# Patient Record
Sex: FEMALE | Race: WHITE | NOT HISPANIC OR LATINO
[De-identification: names, ages, dates, MRNs, and addresses within clinical notes are randomized per-mention and may not be internally consistent; named-entity substitution may affect disease eponyms.]

---

## 2019-01-24 ENCOUNTER — ASOB RESULT (OUTPATIENT)
Age: 29
End: 2019-01-24

## 2019-01-24 ENCOUNTER — APPOINTMENT (OUTPATIENT)
Dept: ANTEPARTUM | Facility: CLINIC | Age: 29
End: 2019-01-24
Payer: MEDICAID

## 2019-01-24 PROCEDURE — 76811 OB US DETAILED SNGL FETUS: CPT

## 2019-01-24 PROCEDURE — 76817 TRANSVAGINAL US OBSTETRIC: CPT

## 2019-05-09 ENCOUNTER — OUTPATIENT (OUTPATIENT)
Dept: OUTPATIENT SERVICES | Facility: HOSPITAL | Age: 29
LOS: 1 days | Discharge: HOME | End: 2019-05-09
Payer: MEDICAID

## 2019-05-09 VITALS — HEART RATE: 82 BPM | SYSTOLIC BLOOD PRESSURE: 116 MMHG | DIASTOLIC BLOOD PRESSURE: 72 MMHG

## 2019-05-09 VITALS — TEMPERATURE: 98 F

## 2019-05-09 LAB
ALLERGY+IMMUNOLOGY DIAG STUDY NOTE: SIGNIFICANT CHANGE UP
TYPE + AB SCN PNL BLD: SIGNIFICANT CHANGE UP

## 2019-05-09 PROCEDURE — 99213 OFFICE O/P EST LOW 20 MIN: CPT | Mod: 25

## 2019-05-09 PROCEDURE — 59412 ANTEPARTUM MANIPULATION: CPT

## 2019-05-09 RX ADMIN — Medication 0.25 MILLIGRAM(S): at 13:15

## 2019-05-09 NOTE — OB PROVIDER TRIAGE NOTE - NSOBPROVIDERNOTE_OBGYN_ALL_OB_FT
30 y/o  @ 38 weeks [facility] for ECV  - efm   - terbutaline  - Dr Dunn aware 30 y/o  @ 38 weeks breech for ECV  - efm   - terbutaline  - Dr Dunn aware    patient with fetus in breech presentation, for ECV, r/b/a rev, ques answered.  Consent obtained.

## 2019-05-09 NOTE — OB PROVIDER TRIAGE NOTE - HISTORY OF PRESENT ILLNESS
30 y/o  @ 38 weeks latesha 19 dated by lmp who is breech who presents for ECV. Pt reports + fm no srom no vaginal bleeding no contractions.  Pt is previous c/s with 2 subsequent successful

## 2019-05-09 NOTE — OB PROVIDER TRIAGE NOTE - NSHPPHYSICALEXAM_GEN_ALL_CORE
PHYSICAL EXAM:  T(F): 98 (05-09 @ 12:30)  blood glucose finger stick 84    Constitutional: AAOx3, NAD  Abdomen: Soft, gravid, nontender, no palpable ctx   CAT I  TOCO Q none  bedside sono breech presentation, placenta posterior  back right side

## 2019-05-09 NOTE — PROGRESS NOTE ADULT - SUBJECTIVE AND OBJECTIVE BOX
External Cephalic Version:    Preprocedure diagnosis: Zachary breech  Postprocedure diagnosis: Zachary breech  Procedure: External Cephalic Version  Findings: Stable FHR pre and post procedure    Clinical Note:  Patient is a 28 yo  at 38w0d GA who presented to L&D for a scheduled external cephalic version. A reactive fetal heart tracing was obtained and transverse presentation was confirmed on sonogram (anterior placenta, adequate fluid). Risks of external cephalic version, including abnormal FHR, PROM, abruption, fetal injury, possible emergent  section and failed external cephalic version were discussed with the patient and informed consent was obtained.    Procedure Note:  Terbutaline was administered at 1316. The fetal pelvis was noted at maternal pelvis, spine along maternal right. The presentation portion of the fetus was elevated out of the maternal pelvis, and using gentle but firm pressure of the 's hands on fetal buttocks and posterior occiput to move fetus is a clockwise motion. Position was held for approximately 1 minute. Fetal heart rate was noted on sonogram to be slow. Pressure was released. Fetal heart monitor was replaced on maternal abdomen. FH was noted to be in the 60s. IV fluid bolus was started, O2 therapy was given, patient position was changed to left lateral. Fetal heart rate slowly increased back to baseline of 120bpm, moderate variability, positive accelerations. She will be monitored for a prolonged period of time to assess for contractions and fetal heart rate abnormalities. She will have a confirmatory sonogram prior to discharge if discharged.    Dr. Dunn at bedside

## 2019-05-09 NOTE — PROGRESS NOTE ADULT - ATTENDING COMMENTS
Patient presented for elective ECV at 38 weeks, h/o one , three vaginal births.    post r/b/a rev, ques answered, consent obtained, rh +, terb administered IV.  One forward roll attempted, fetal bradycarida noted.  O2 aministered, O.R. room set up.  FHR recovered.     FHR reactive, category one, bpp 8/8.    Options rev, will d/c home and f/u within one week.    precautions

## 2019-05-17 ENCOUNTER — INPATIENT (INPATIENT)
Facility: HOSPITAL | Age: 29
LOS: 1 days | Discharge: HOME | End: 2019-05-19
Attending: OBSTETRICS & GYNECOLOGY | Admitting: OBSTETRICS & GYNECOLOGY
Payer: MEDICAID

## 2019-05-17 VITALS
RESPIRATION RATE: 20 BRPM | TEMPERATURE: 98 F | HEART RATE: 93 BPM | SYSTOLIC BLOOD PRESSURE: 128 MMHG | DIASTOLIC BLOOD PRESSURE: 82 MMHG

## 2019-05-17 LAB
AMPHET UR-MCNC: NEGATIVE — SIGNIFICANT CHANGE UP
APPEARANCE UR: ABNORMAL
BACTERIA # UR AUTO: ABNORMAL /HPF
BARBITURATES UR SCN-MCNC: NEGATIVE — SIGNIFICANT CHANGE UP
BASOPHILS # BLD AUTO: 0.04 K/UL — SIGNIFICANT CHANGE UP (ref 0–0.2)
BASOPHILS NFR BLD AUTO: 0.4 % — SIGNIFICANT CHANGE UP (ref 0–1)
BENZODIAZ UR-MCNC: NEGATIVE — SIGNIFICANT CHANGE UP
BILIRUB UR-MCNC: NEGATIVE — SIGNIFICANT CHANGE UP
BLD GP AB SCN SERPL QL: SIGNIFICANT CHANGE UP
BUPRENORPHINE SCREEN, URINE RESULT: NEGATIVE — SIGNIFICANT CHANGE UP
COCAINE METAB.OTHER UR-MCNC: NEGATIVE — SIGNIFICANT CHANGE UP
COLOR SPEC: YELLOW — SIGNIFICANT CHANGE UP
DIFF PNL FLD: NEGATIVE — SIGNIFICANT CHANGE UP
EOSINOPHIL # BLD AUTO: 0.03 K/UL — SIGNIFICANT CHANGE UP (ref 0–0.7)
EOSINOPHIL NFR BLD AUTO: 0.3 % — SIGNIFICANT CHANGE UP (ref 0–8)
GLUCOSE UR QL: NEGATIVE MG/DL — SIGNIFICANT CHANGE UP
HCT VFR BLD CALC: 37.7 % — SIGNIFICANT CHANGE UP (ref 37–47)
HGB BLD-MCNC: 13.4 G/DL — SIGNIFICANT CHANGE UP (ref 12–16)
IMM GRANULOCYTES NFR BLD AUTO: 0.6 % — HIGH (ref 0.1–0.3)
KETONES UR-MCNC: 15
L&D DRUG SCREEN, URINE: SIGNIFICANT CHANGE UP
LEUKOCYTE ESTERASE UR-ACNC: ABNORMAL
LYMPHOCYTES # BLD AUTO: 2.53 K/UL — SIGNIFICANT CHANGE UP (ref 1.2–3.4)
LYMPHOCYTES # BLD AUTO: 23.9 % — SIGNIFICANT CHANGE UP (ref 20.5–51.1)
MCHC RBC-ENTMCNC: 33.3 PG — HIGH (ref 27–31)
MCHC RBC-ENTMCNC: 35.5 G/DL — SIGNIFICANT CHANGE UP (ref 32–37)
MCV RBC AUTO: 93.5 FL — SIGNIFICANT CHANGE UP (ref 81–99)
METHADONE UR-MCNC: NEGATIVE — SIGNIFICANT CHANGE UP
MONOCYTES # BLD AUTO: 0.85 K/UL — HIGH (ref 0.1–0.6)
MONOCYTES NFR BLD AUTO: 8 % — SIGNIFICANT CHANGE UP (ref 1.7–9.3)
NEUTROPHILS # BLD AUTO: 7.09 K/UL — HIGH (ref 1.4–6.5)
NEUTROPHILS NFR BLD AUTO: 66.8 % — SIGNIFICANT CHANGE UP (ref 42.2–75.2)
NITRITE UR-MCNC: NEGATIVE — SIGNIFICANT CHANGE UP
NRBC # BLD: 0 /100 WBCS — SIGNIFICANT CHANGE UP (ref 0–0)
OPIATES UR-MCNC: NEGATIVE — SIGNIFICANT CHANGE UP
OXYCODONE UR-MCNC: NEGATIVE — SIGNIFICANT CHANGE UP
PCP UR-MCNC: NEGATIVE — SIGNIFICANT CHANGE UP
PH UR: 6 — SIGNIFICANT CHANGE UP (ref 5–8)
PLATELET # BLD AUTO: 133 K/UL — SIGNIFICANT CHANGE UP (ref 130–400)
PRENATAL SYPHILIS TEST: SIGNIFICANT CHANGE UP
PROPOXYPHENE QUALITATIVE URINE RESULT: NEGATIVE — SIGNIFICANT CHANGE UP
PROT UR-MCNC: NEGATIVE MG/DL — SIGNIFICANT CHANGE UP
RBC # BLD: 4.03 M/UL — LOW (ref 4.2–5.4)
RBC # FLD: 13.2 % — SIGNIFICANT CHANGE UP (ref 11.5–14.5)
SP GR SPEC: 1.01 — SIGNIFICANT CHANGE UP (ref 1.01–1.03)
TYPE + AB SCN PNL BLD: SIGNIFICANT CHANGE UP
UROBILINOGEN FLD QL: 0.2 MG/DL — SIGNIFICANT CHANGE UP (ref 0.2–0.2)
WBC # BLD: 10.6 K/UL — SIGNIFICANT CHANGE UP (ref 4.8–10.8)
WBC # FLD AUTO: 10.6 K/UL — SIGNIFICANT CHANGE UP (ref 4.8–10.8)
WBC UR QL: SIGNIFICANT CHANGE UP /HPF

## 2019-05-17 PROCEDURE — 59412 ANTEPARTUM MANIPULATION: CPT | Mod: 59

## 2019-05-17 PROCEDURE — 59510 CESAREAN DELIVERY: CPT | Mod: U9

## 2019-05-17 RX ORDER — SODIUM CHLORIDE 9 MG/ML
1000 INJECTION, SOLUTION INTRAVENOUS
Refills: 0 | Status: DISCONTINUED | OUTPATIENT
Start: 2019-05-17 | End: 2019-05-17

## 2019-05-17 RX ORDER — MAGNESIUM HYDROXIDE 400 MG/1
30 TABLET, CHEWABLE ORAL
Refills: 0 | Status: DISCONTINUED | OUTPATIENT
Start: 2019-05-17 | End: 2019-05-19

## 2019-05-17 RX ORDER — CEFAZOLIN SODIUM 1 G
2000 VIAL (EA) INJECTION EVERY 8 HOURS
Refills: 0 | Status: COMPLETED | OUTPATIENT
Start: 2019-05-17 | End: 2019-05-18

## 2019-05-17 RX ORDER — ENOXAPARIN SODIUM 100 MG/ML
40 INJECTION SUBCUTANEOUS AT BEDTIME
Refills: 0 | Status: DISCONTINUED | OUTPATIENT
Start: 2019-05-17 | End: 2019-05-19

## 2019-05-17 RX ORDER — DOCUSATE SODIUM 100 MG
100 CAPSULE ORAL
Refills: 0 | Status: DISCONTINUED | OUTPATIENT
Start: 2019-05-17 | End: 2019-05-19

## 2019-05-17 RX ORDER — GLYCERIN ADULT
1 SUPPOSITORY, RECTAL RECTAL AT BEDTIME
Refills: 0 | Status: DISCONTINUED | OUTPATIENT
Start: 2019-05-17 | End: 2019-05-19

## 2019-05-17 RX ORDER — BUTORPHANOL TARTRATE 2 MG/ML
2 INJECTION, SOLUTION INTRAMUSCULAR; INTRAVENOUS ONCE
Refills: 0 | Status: DISCONTINUED | OUTPATIENT
Start: 2019-05-17 | End: 2019-05-19

## 2019-05-17 RX ORDER — OXYTOCIN 10 UNIT/ML
333.33 VIAL (ML) INJECTION
Qty: 20 | Refills: 0 | Status: DISCONTINUED | OUTPATIENT
Start: 2019-05-17 | End: 2019-05-19

## 2019-05-17 RX ORDER — KETOROLAC TROMETHAMINE 30 MG/ML
30 SYRINGE (ML) INJECTION EVERY 6 HOURS
Refills: 0 | Status: DISCONTINUED | OUTPATIENT
Start: 2019-05-17 | End: 2019-05-18

## 2019-05-17 RX ORDER — LANOLIN
1 OINTMENT (GRAM) TOPICAL EVERY 6 HOURS
Refills: 0 | Status: DISCONTINUED | OUTPATIENT
Start: 2019-05-17 | End: 2019-05-19

## 2019-05-17 RX ORDER — ONDANSETRON 8 MG/1
4 TABLET, FILM COATED ORAL EVERY 6 HOURS
Refills: 0 | Status: DISCONTINUED | OUTPATIENT
Start: 2019-05-17 | End: 2019-05-19

## 2019-05-17 RX ORDER — OXYCODONE HYDROCHLORIDE 5 MG/1
5 TABLET ORAL
Refills: 0 | Status: DISCONTINUED | OUTPATIENT
Start: 2019-05-17 | End: 2019-05-19

## 2019-05-17 RX ORDER — IBUPROFEN 200 MG
600 TABLET ORAL EVERY 6 HOURS
Refills: 0 | Status: COMPLETED | OUTPATIENT
Start: 2019-05-17 | End: 2020-04-14

## 2019-05-17 RX ORDER — ACETAMINOPHEN 500 MG
650 TABLET ORAL EVERY 6 HOURS
Refills: 0 | Status: DISCONTINUED | OUTPATIENT
Start: 2019-05-17 | End: 2019-05-19

## 2019-05-17 RX ORDER — OXYCODONE HYDROCHLORIDE 5 MG/1
5 TABLET ORAL ONCE
Refills: 0 | Status: DISCONTINUED | OUTPATIENT
Start: 2019-05-17 | End: 2019-05-19

## 2019-05-17 RX ORDER — SODIUM CHLORIDE 9 MG/ML
1000 INJECTION, SOLUTION INTRAVENOUS
Refills: 0 | Status: DISCONTINUED | OUTPATIENT
Start: 2019-05-17 | End: 2019-05-19

## 2019-05-17 RX ORDER — SIMETHICONE 80 MG/1
80 TABLET, CHEWABLE ORAL EVERY 4 HOURS
Refills: 0 | Status: DISCONTINUED | OUTPATIENT
Start: 2019-05-17 | End: 2019-05-19

## 2019-05-17 RX ORDER — MORPHINE SULFATE 50 MG/1
2 CAPSULE, EXTENDED RELEASE ORAL ONCE
Refills: 0 | Status: DISCONTINUED | OUTPATIENT
Start: 2019-05-17 | End: 2019-05-19

## 2019-05-17 RX ORDER — DEXAMETHASONE 0.5 MG/5ML
4 ELIXIR ORAL EVERY 6 HOURS
Refills: 0 | Status: DISCONTINUED | OUTPATIENT
Start: 2019-05-17 | End: 2019-05-19

## 2019-05-17 RX ORDER — NALOXONE HYDROCHLORIDE 4 MG/.1ML
0.1 SPRAY NASAL
Refills: 0 | Status: DISCONTINUED | OUTPATIENT
Start: 2019-05-17 | End: 2019-05-19

## 2019-05-17 RX ORDER — CEFAZOLIN SODIUM 1 G
2000 VIAL (EA) INJECTION ONCE
Refills: 0 | Status: COMPLETED | OUTPATIENT
Start: 2019-05-17 | End: 2019-05-17

## 2019-05-17 RX ORDER — DIPHENHYDRAMINE HCL 50 MG
25 CAPSULE ORAL EVERY 6 HOURS
Refills: 0 | Status: DISCONTINUED | OUTPATIENT
Start: 2019-05-17 | End: 2019-05-19

## 2019-05-17 RX ORDER — ACETAMINOPHEN 500 MG
1000 TABLET ORAL ONCE
Refills: 0 | Status: DISCONTINUED | OUTPATIENT
Start: 2019-05-17 | End: 2019-05-19

## 2019-05-17 RX ADMIN — SIMETHICONE 80 MILLIGRAM(S): 80 TABLET, CHEWABLE ORAL at 17:58

## 2019-05-17 RX ADMIN — Medication 100 MILLIGRAM(S): at 17:58

## 2019-05-17 RX ADMIN — ENOXAPARIN SODIUM 40 MILLIGRAM(S): 100 INJECTION SUBCUTANEOUS at 21:21

## 2019-05-17 RX ADMIN — SIMETHICONE 80 MILLIGRAM(S): 80 TABLET, CHEWABLE ORAL at 21:22

## 2019-05-17 RX ADMIN — Medication 100 MILLIGRAM(S): at 21:21

## 2019-05-17 RX ADMIN — Medication 30 MILLIGRAM(S): at 17:57

## 2019-05-17 RX ADMIN — SIMETHICONE 80 MILLIGRAM(S): 80 TABLET, CHEWABLE ORAL at 14:57

## 2019-05-17 RX ADMIN — Medication 100 MILLIGRAM(S): at 14:57

## 2019-05-17 RX ADMIN — Medication 100 MILLIGRAM(S): at 05:53

## 2019-05-17 NOTE — BRIEF OPERATIVE NOTE - OPERATION/FINDINGS
Live infant delivered in chloé breech presentation, APGARs 9/9, Bwt 3670g. Normal appearing uterus, fallopian tubes and ovaries. Live male infant delivered in chloé breech presentation, APGARs 9/9, Bwt 3670g. Normal appearing uterus, fallopian tubes and ovaries.

## 2019-05-17 NOTE — PRE-ANESTHESIA EVALUATION ADULT - NSANTHOSAYNRD_GEN_A_CORE
No. SAGRARIO screening performed.  STOP BANG Legend: 0-2 = LOW Risk; 3-4 = INTERMEDIATE Risk; 5-8 = HIGH Risk

## 2019-05-17 NOTE — OB PROVIDER H&P - HISTORY OF PRESENT ILLNESS
28yo , at 39w1d, dated by LMP, GBS negative, in labor with complete breech presentation. She reports contractions started yesterday morning, and got worse throughout the day. She reports her pain right now about 5/10 and is doing well. Denies complications during the pregnancy other than breech presentation. She had an attempted ECV on 19, which was unsuccessful. Denies LOF and VB, reports good FM.

## 2019-05-17 NOTE — BRIEF OPERATIVE NOTE - COMMENTS
Urgent  section after external cephalic version for non-reassuring fetal heart tracing, otherwise uncomplicated.    Dictation: Urgent  section after unsuccessful external cephalic version resulting in a non-reassuring fetal heart tracing, otherwise uncomplicated. Intercede placed.    Dictation: [id number] Urgent  section after unsuccessful external cephalic version resulting in a non-reassuring fetal heart tracing, otherwise uncomplicated. Intercede placed.    Dictation: [id number]  as above, repeat c-section called after fhr to 90 post version, no significant time lapse.

## 2019-05-17 NOTE — OB PROVIDER H&P - ASSESSMENT
30yo , at 39w1d, dated by LMP, GBS negative, in active labor, grand multip, with complete breech presentation.   -OPOSITIVE, Measles immune, Rubella immune   -Admit to L+D  -Monitor EFM and TOCO   -IVF and labs  - Spinal Anesthesia     Dr. Dunn aware, Dr. Hodges aware

## 2019-05-17 NOTE — OB PROVIDER H&P - ATTENDING COMMENTS
30 y/o [id number] at 39.1 weeks scheduled for repeat  for breech yesterday, cancelled on own, now with irregular contractions.  +fm, no rom, no bleeding.  Wants one more attempt at ECV.    PNC: as above  persistent breech  abd: x=6431 g, nt  ext: no edema  cx: /-3/i  nst: reactive  post r/b/a rev, ques answered, consent obtained for ECV and .  Post Spinal, one attempt at version unsuccessful,  for repeat

## 2019-05-17 NOTE — BRIEF OPERATIVE NOTE - NSICDXBRIEFPREOP_GEN_ALL_CORE_FT
PRE-OP DIAGNOSIS:  Breech presentation 17-May-2019 07:42:13  Kirsten Hodges  Previous  section 17-May-2019 07:42:03  Kirsten Hodges  Term pregnancy 17-May-2019 07:41:52 39w1d Kirsten Hodges PRE-OP DIAGNOSIS:  Non-reassuring fetal heart tones complicating pregnancy, antepartum 17-May-2019 07:50:15  Kirsten Hodges  Breech presentation 17-May-2019 07:42:13  Kirsten Hodges  Previous  section 17-May-2019 07:42:03  Kirsten Hodges  Term pregnancy 17-May-2019 07:41:52 39w1d Kirsten Hodges

## 2019-05-17 NOTE — BRIEF OPERATIVE NOTE - NSICDXBRIEFPROCEDURE_GEN_ALL_CORE_FT
PROCEDURES:  Repeat low transverse  section 17-May-2019 07:41:29 via Pfannenstiel incision Kirsten Hodges PROCEDURES:  External cephalic version 17-May-2019 07:47:24 Attempted and unsuccessful   Kirsten Hodges  Repeat low transverse  section 17-May-2019 07:41:29 via Pfannenstiel incision Kirsten Hodges

## 2019-05-17 NOTE — PROGRESS NOTE ADULT - ASSESSMENT
A/P:  28yo Female s/p stat  section for failed ECV in labor, POD0, doing well, no issues.  -Encouraged ambulation and PO hydration  -Pain management PRN  -Advance diet as tolerated  -F/u CBC this evening A/P:  30yo Female s/p stat  section for failed ECV in labor, POD0, doing well, no issues.  -TOV by 1700  -Encouraged ambulation and PO hydration  -Pain management PRN  -Advance diet as tolerated  -F/u CBC this evening

## 2019-05-17 NOTE — BRIEF OPERATIVE NOTE - NSICDXBRIEFPOSTOP_GEN_ALL_CORE_FT
POST-OP DIAGNOSIS:  Breech presentation 17-May-2019 07:43:27  Kirsten Hodges  Previous  section 17-May-2019 07:42:36  Kirsten Hodges  Term pregnancy delivered 17-May-2019 07:42:24 39w1d Kirsten Hodges

## 2019-05-17 NOTE — OB PROVIDER H&P - NSHPPHYSICALEXAM_GEN_ALL_CORE
Vital Signs Last 24 Hrs  T(C): 36.6 (17 May 2019 05:08), Max: 36.6 (17 May 2019 04:48)  T(F): 97.8 (17 May 2019 05:08), Max: 97.8 (17 May 2019 04:48)  HR: 93 (17 May 2019 05:08) (93 - 93)  BP: 128/82 (17 May 2019 05:08) (128/82 - 128/82)  RR: 18 (17 May 2019 05:08) (18 - 20)    FHR 120bpm/ mod ivania/ accels/ loss of contact  TOCO occsaional   SVE 6/80/-2, intact, breech   SONO at bedside: complete breech, MVP 3.7cm, anterior placenta

## 2019-05-17 NOTE — OB PROVIDER H&P - NS_OBGYNHISTORY_OBGYN_ALL_OB_FT
OB hx:     6'8 oz   breech fo r c/s 7'9 oz    6'13 oz  2016  7'4 oz  Gyn: Denies history of abnormal papsmears, STIs, uterine fibroids or ovarian cysts

## 2019-05-17 NOTE — PRE-ANESTHESIA EVALUATION ADULT - NSANTHADDINFOFT_GEN_ALL_CORE
Pt was scheduled for Repeat C/S yesterday, 05/16/2019, but chose not to come in on advice from her Rabbi.  Upon admission this morning, pt's VE:  6 cm dilated, Breech.  Ext Version had failed on previous visit to Hospital.  Witnessed, written, informed Anesthesia Consent obtained for Spinal/Epidural/General Anesthesia.  Pt understands and agrees to Anesthetic Plan.

## 2019-05-18 LAB
BASOPHILS # BLD AUTO: 0.03 K/UL — SIGNIFICANT CHANGE UP (ref 0–0.2)
BASOPHILS NFR BLD AUTO: 0.3 % — SIGNIFICANT CHANGE UP (ref 0–1)
EOSINOPHIL # BLD AUTO: 0.03 K/UL — SIGNIFICANT CHANGE UP (ref 0–0.7)
EOSINOPHIL NFR BLD AUTO: 0.3 % — SIGNIFICANT CHANGE UP (ref 0–8)
HCT VFR BLD CALC: 28.8 % — LOW (ref 37–47)
HGB BLD-MCNC: 9.9 G/DL — LOW (ref 12–16)
IMM GRANULOCYTES NFR BLD AUTO: 0.5 % — HIGH (ref 0.1–0.3)
LYMPHOCYTES # BLD AUTO: 1.8 K/UL — SIGNIFICANT CHANGE UP (ref 1.2–3.4)
LYMPHOCYTES # BLD AUTO: 17.4 % — LOW (ref 20.5–51.1)
MCHC RBC-ENTMCNC: 33.3 PG — HIGH (ref 27–31)
MCHC RBC-ENTMCNC: 34.4 G/DL — SIGNIFICANT CHANGE UP (ref 32–37)
MCV RBC AUTO: 97 FL — SIGNIFICANT CHANGE UP (ref 81–99)
MONOCYTES # BLD AUTO: 0.98 K/UL — HIGH (ref 0.1–0.6)
MONOCYTES NFR BLD AUTO: 9.5 % — HIGH (ref 1.7–9.3)
NEUTROPHILS # BLD AUTO: 7.44 K/UL — HIGH (ref 1.4–6.5)
NEUTROPHILS NFR BLD AUTO: 72 % — SIGNIFICANT CHANGE UP (ref 42.2–75.2)
NRBC # BLD: 0 /100 WBCS — SIGNIFICANT CHANGE UP (ref 0–0)
PLATELET # BLD AUTO: 108 K/UL — LOW (ref 130–400)
RBC # BLD: 2.97 M/UL — LOW (ref 4.2–5.4)
RBC # FLD: 13.7 % — SIGNIFICANT CHANGE UP (ref 11.5–14.5)
WBC # BLD: 10.33 K/UL — SIGNIFICANT CHANGE UP (ref 4.8–10.8)
WBC # FLD AUTO: 10.33 K/UL — SIGNIFICANT CHANGE UP (ref 4.8–10.8)

## 2019-05-18 RX ORDER — IBUPROFEN 200 MG
600 TABLET ORAL EVERY 6 HOURS
Refills: 0 | Status: DISCONTINUED | OUTPATIENT
Start: 2019-05-18 | End: 2019-05-19

## 2019-05-18 RX ADMIN — Medication 600 MILLIGRAM(S): at 11:17

## 2019-05-18 RX ADMIN — Medication 100 MILLIGRAM(S): at 06:26

## 2019-05-18 RX ADMIN — Medication 30 MILLIGRAM(S): at 00:04

## 2019-05-18 RX ADMIN — Medication 30 MILLIGRAM(S): at 06:25

## 2019-05-18 RX ADMIN — SIMETHICONE 80 MILLIGRAM(S): 80 TABLET, CHEWABLE ORAL at 18:19

## 2019-05-18 RX ADMIN — Medication 100 MILLIGRAM(S): at 06:25

## 2019-05-18 RX ADMIN — SIMETHICONE 80 MILLIGRAM(S): 80 TABLET, CHEWABLE ORAL at 13:28

## 2019-05-18 RX ADMIN — SIMETHICONE 80 MILLIGRAM(S): 80 TABLET, CHEWABLE ORAL at 21:29

## 2019-05-18 RX ADMIN — Medication 600 MILLIGRAM(S): at 18:19

## 2019-05-18 RX ADMIN — SIMETHICONE 80 MILLIGRAM(S): 80 TABLET, CHEWABLE ORAL at 06:24

## 2019-05-18 RX ADMIN — ENOXAPARIN SODIUM 40 MILLIGRAM(S): 100 INJECTION SUBCUTANEOUS at 21:29

## 2019-05-18 RX ADMIN — SIMETHICONE 80 MILLIGRAM(S): 80 TABLET, CHEWABLE ORAL at 01:34

## 2019-05-18 RX ADMIN — SIMETHICONE 80 MILLIGRAM(S): 80 TABLET, CHEWABLE ORAL at 09:41

## 2019-05-18 RX ADMIN — Medication 100 MILLIGRAM(S): at 18:19

## 2019-05-19 ENCOUNTER — TRANSCRIPTION ENCOUNTER (OUTPATIENT)
Age: 29
End: 2019-05-19

## 2019-05-19 VITALS
DIASTOLIC BLOOD PRESSURE: 53 MMHG | SYSTOLIC BLOOD PRESSURE: 100 MMHG | RESPIRATION RATE: 18 BRPM | TEMPERATURE: 98 F | HEART RATE: 62 BPM

## 2019-05-19 RX ORDER — IBUPROFEN 200 MG
1 TABLET ORAL
Qty: 33 | Refills: 2
Start: 2019-05-19

## 2019-05-19 RX ORDER — DOCUSATE SODIUM 100 MG
1 CAPSULE ORAL
Qty: 0 | Refills: 0 | DISCHARGE
Start: 2019-05-19

## 2019-05-19 RX ORDER — ACETAMINOPHEN 500 MG
2 TABLET ORAL
Qty: 0 | Refills: 0 | DISCHARGE
Start: 2019-05-19

## 2019-05-19 RX ORDER — IBUPROFEN 200 MG
1 TABLET ORAL
Qty: 0 | Refills: 0 | DISCHARGE
Start: 2019-05-19

## 2019-05-19 RX ADMIN — Medication 600 MILLIGRAM(S): at 06:10

## 2019-05-19 RX ADMIN — SIMETHICONE 80 MILLIGRAM(S): 80 TABLET, CHEWABLE ORAL at 06:10

## 2019-05-19 RX ADMIN — Medication 600 MILLIGRAM(S): at 00:00

## 2019-05-19 RX ADMIN — Medication 100 MILLIGRAM(S): at 06:09

## 2019-05-19 RX ADMIN — SIMETHICONE 80 MILLIGRAM(S): 80 TABLET, CHEWABLE ORAL at 01:12

## 2019-05-19 RX ADMIN — SIMETHICONE 80 MILLIGRAM(S): 80 TABLET, CHEWABLE ORAL at 10:13

## 2019-05-19 NOTE — DISCHARGE NOTE OB - MEDICATION SUMMARY - MEDICATIONS TO TAKE
I will START or STAY ON the medications listed below when I get home from the hospital:    ibuprofen 600 mg oral tablet  -- 1 tab(s) by mouth every 6 hours, As Needed  -- Indication: For pain    acetaminophen 325 mg oral tablet  -- 2 tab(s) by mouth every 6 hours, As needed, Temp greater or equal to 38C (100.4F), Mild Pain (1 - 3)  -- Indication: For pain    docusate sodium 100 mg oral capsule  -- 1 cap(s) by mouth 2 times a day  -- Indication: For constipation I will START or STAY ON the medications listed below when I get home from the hospital:    ibuprofen 600 mg oral tablet  -- 1 tab(s) by mouth every 6 hours, As Needed  -- Indication: For pain    acetaminophen 325 mg oral tablet  -- 2 tab(s) by mouth every 6 hours, As needed, Temp greater or equal to 38C (100.4F), Mild Pain (1 - 3)  -- Indication: For pain    docusate sodium 100 mg oral capsule  -- 1 cap(s) by mouth 2 times a day, As Needed  -- Indication: For Constipation

## 2019-05-19 NOTE — DISCHARGE NOTE OB - HOSPITAL COURSE
19 @ 11:12    HPI:  28yo , at 39w1d, dated by LMP, GBS negative, in labor with complete breech presentation. She reports contractions started yesterday morning, and got worse throughout the day. She reports her pain right now about 5/10 and is doing well. Denies complications during the pregnancy other than breech presentation. She had an attempted ECV on 19, which was unsuccessful. Denies LOF and VB, reports good FM. (17 May 2019 05:12)      PAST MEDICAL & SURGICAL HISTORY:  No pertinent past medical history  No significant past surgical history      POST PARTUM COURSE:      30 yo now , s/p urgent repeat  section for NRFHT and failed ECV for breech presentation, POD#2, doing well, discharged PPD2    LABS:                        9.9    10.33 )-----------( 108      ( 18 May 2019 04:30 )             28.8                   Allergies    Bactrim (Rash)    Intolerances

## 2019-05-19 NOTE — DISCHARGE NOTE OB - CARE PROVIDER_API CALL
Pernell Phillips)  OBSGYN  Physicians  [address] [place] [address]  Phone: [phone]  Fax: [phone]  Follow Up Time:

## 2019-05-19 NOTE — PROGRESS NOTE ADULT - ATTENDING COMMENTS
POD#1, s/p c/s for breech  dong well   regular diet  d/c nancy
POD#2, doing well  anticipate d/c home tmrw

## 2019-05-19 NOTE — DISCHARGE NOTE OB - PATIENT PORTAL LINK FT
You can access the [facility] Patient Portal, offered by [facility], by registering with the following website: [email or web address]

## 2019-05-19 NOTE — PROGRESS NOTE ADULT - ASSESSMENT
30 yo now , s/p urgent repeat  section for NRFHT and failed ECV for breech presentation, POD#2, doing well.    - pain management PRN  - c/w regular diet, PO hydration  - encouraged ambulation, incentive spirometry  - monitor vitals, bleeding  - lovenox for DVT ppx  - anticipate d/c tomorrow    Dr. Santos aware and Dr. Phillips to be made aware.

## 2019-05-19 NOTE — PROGRESS NOTE ADULT - SUBJECTIVE AND OBJECTIVE BOX
Chief Complaint: Post  section    HPI: Pt doing well, pain well controlled. No acute complaints. Voided this AM after menendez removal without difficulty. Has not gotten out of bed yet.    ROS: Denies cardiovascular or respiratory symptoms    PAST MEDICAL & SURGICAL HISTORY:  No pertinent past medical history  No significant past surgical history      Physical Exam  Vital Signs Last 24 Hrs  T(F): 99.2 (17 May 2019 14:28), Max: 99.2 (17 May 2019 14:28)  HR: 63 (17 May 2019 14:28) (52 - 93)  BP: 100/55 (17 May 2019 14:28) (100/55 - 133/76)  RR: 18 (17 May 2019 14:28) (18 - 20)    Physical exam:  General - AAOx3, NAD  Heart - S1S2, RRR  Lungs - CTA BL  Abdomen:  - Soft, appropriately tender, mildly distended, BS+. Clean, dry, intact dressing in place.  - Fundus firm, appropriately tender, below the umbilicus  Pelvis/Vagina - Normal Lochia  Extremities - No calf tenderness, no swelling    Labs:                        13.4   10.60 )-----------( 133      ( 17 May 2019 05:15 )             37.7     Type + Screen: O POS (19 @ 05:15)  Antibody Screen: NEG (19 @ 05:15)    Prenatal Syphilis Test: Nonreact (19 @ 05:15)
Chief Complaint: Postpartum    HPI: Pt doing well, pain well controlled. No overnight events, no acute complaints. Tolerating clear liquid diet, ambulating, voiding. Passed flatus, no BM yet. Breastfeeding.     ROS: Denies cardiovascular or respiratory symptoms    PAST MEDICAL & SURGICAL HISTORY:  No pertinent past medical history  No significant past surgical history    Physical Exam  Vital Signs Last 24 Hrs  T(F): 98.1 (18 May 2019 03:14), Max: 103 (17 May 2019 20:59)  HR: 51 (18 May 2019 03:14) (51 - 81)  BP: 92/51 (18 May 2019 03:14) (92/51 - 133/76)  RR: 20 (18 May 2019 03:14) (18 - 20)    Physical exam:  General - AAOx3, NAD  Heart - S1S2, RRR  Lungs - CTA BL  Abdomen:  - Soft, nontender, nondistended, BS+, dressing changed, dressing and incision both dry, clean and intact  - Fundus firm, nontender, below the umbilicus  Pelvis/Vagina - Normal Lochia  Extremities: No calf tenderness, no swelling bilaterally    Labs:                        13.4   10.60 )-----------( 133      ( 17 May 2019 05:15 )             37.7     Type + Screen: O POS (19 @ 05:15)  Antibody Screen: NEG (19 @ 05:15)    Assessment:   28yo now , s/p urgent repeat  section for NRFHT and failed ECV for breech, POD#1, recovering well,     Plan:  -Routine postpartum care  -Encourage ambulation and incentive spirometry use  -Advance diet as tolerated  -Lovenox as ordered  -f/u AM labs  -Ancef x 24hrs postop for betadine prep ( [id number])   -Pain management prn     Dr. Fabian and Dr. Phillips to be made aware.
PGY 1 Post Partum note    Subjective: Pt seen and examined at bedside. Doing well, pain controlled. Pt is voiding without difficulty, passing flatus, ambulating, tolerating regular diet, breast feeding. Denies CP, SOB, N/V, LE pain.    Physical exam:    Vital Signs Last 24 Hrs  T(F): 97 (19 May 2019 00:00), Max: 97.7 (18 May 2019 16:04)  HR: 56 (19 May 2019 00:00) (56 - 71)  BP: 106/59 (19 May 2019 00:00) (102/66 - 106/65)  RR: 20 (19 May 2019 00:00) (18 - 20)    Gen: NAD  CVS: s1s2, rrr  Lungs: ctab, no r/r/w  Abdomen: Soft, appropriately tender, no distension , firm uterine fundus below umbilicus, +BS  Incision: c/d/i  Pelvic: Normal lochia rubra noted  Ext: No calf tenderness    Diet: Regular  meds:   docusate sodium   100 milliGRAM(s) Oral (05-19-19 @ 06:09)   100 milliGRAM(s) Oral (05-18-19 @ 18:19)    enoxaparin Injectable   40 milliGRAM(s) SubCutaneous (05-18-19 @ 21:29)    ibuprofen  Tablet.   600 milliGRAM(s) Oral (05-19-19 @ 06:10)   600 milliGRAM(s) Oral (05-19-19 @ 00:00)   600 milliGRAM(s) Oral (05-18-19 @ 18:19)   600 milliGRAM(s) Oral (05-18-19 @ 11:17)    simethicone   80 milliGRAM(s) Chew (05-19-19 @ 06:10)   80 milliGRAM(s) Chew (05-19-19 @ 01:12)   80 milliGRAM(s) Chew (05-18-19 @ 21:29)   80 milliGRAM(s) Chew (05-18-19 @ 18:19)   80 milliGRAM(s) Chew (05-18-19 @ 13:28)   80 milliGRAM(s) Chew (05-18-19 @ 09:41)        LABS:                        9.9    10.33 )-----------( 108      ( 18 May 2019 04:30 )             28.8

## 2019-05-30 DIAGNOSIS — O34.211 MATERNAL CARE FOR LOW TRANSVERSE SCAR FROM PREVIOUS CESAREAN DELIVERY: ICD-10-CM

## 2019-05-30 DIAGNOSIS — Z3A.39 39 WEEKS GESTATION OF PREGNANCY: ICD-10-CM

## 2019-05-30 DIAGNOSIS — D62 ACUTE POSTHEMORRHAGIC ANEMIA: ICD-10-CM

## 2019-05-30 DIAGNOSIS — Z88.1 ALLERGY STATUS TO OTHER ANTIBIOTIC AGENTS STATUS: ICD-10-CM

## 2019-10-08 NOTE — OB PROVIDER TRIAGE NOTE - NS_CONTRACTPATTER_OBGYN_ALL_OB
Prescription approved per [facility] Refill Protocol.    Patient has upcoming appt.    Thank you    Mag PIEDRA RN     Irregular

## 2019-12-06 NOTE — OB RN TRIAGE NOTE - LIVING CHILDREN, OB PROFILE
Airway  Urgency: elective    Date/Time: 12/6/2019 3:07 PM  Airway not difficult    General Information and Staff    Patient location during procedure: OR  CRNA: Janett Becker CRNA    Indications and Patient Condition  Indications for airway management: airway protection    Preoxygenated: yes  MILS not maintained throughout  Mask difficulty assessment: 1 - vent by mask    Final Airway Details  Final airway type: endotracheal airway      Successful airway: ETT  Cuffed: yes   Successful intubation technique: direct laryngoscopy  Facilitating devices/methods: intubating stylet  Endotracheal tube insertion site: oral  Blade: Zelaya  Blade size: 2  ETT size (mm): 7.0  Cormack-Lehane Classification: grade I - full view of glottis  Placement verified by: chest auscultation and capnometry   Measured from: lips  ETT/EBT  to lips (cm): 20  Number of attempts at approach: 1    Additional Comments  Negative epigastric sounds, Breath sound equal bilaterally with symmetric chest rise and fall.  Teeth intact, atraumatic             4

## 2021-01-11 PROBLEM — Z78.9 OTHER SPECIFIED HEALTH STATUS: Chronic | Status: ACTIVE | Noted: 2019-05-17

## 2021-02-02 ENCOUNTER — APPOINTMENT (OUTPATIENT)
Dept: ANTEPARTUM | Facility: CLINIC | Age: 31
End: 2021-02-02
Payer: MEDICAID

## 2021-02-02 ENCOUNTER — ASOB RESULT (OUTPATIENT)
Age: 31
End: 2021-02-02

## 2021-02-02 PROCEDURE — 99072 ADDL SUPL MATRL&STAF TM PHE: CPT

## 2021-02-02 PROCEDURE — 76811 OB US DETAILED SNGL FETUS: CPT

## 2025-02-26 NOTE — OB RN PATIENT PROFILE - NS_BABIESUTERO_OBGYN_ALL_OB_NU
[NL] : no abnormal lymph nodes palpated [de-identified] : faint pink to labia minora and perirectal area 1